# Patient Record
Sex: MALE | Race: OTHER
[De-identification: names, ages, dates, MRNs, and addresses within clinical notes are randomized per-mention and may not be internally consistent; named-entity substitution may affect disease eponyms.]

---

## 2019-05-24 PROBLEM — Z00.00 ENCOUNTER FOR PREVENTIVE HEALTH EXAMINATION: Status: ACTIVE | Noted: 2019-05-24

## 2019-05-28 ENCOUNTER — APPOINTMENT (OUTPATIENT)
Dept: ORTHOPEDIC SURGERY | Facility: CLINIC | Age: 34
End: 2019-05-28
Payer: COMMERCIAL

## 2019-05-28 DIAGNOSIS — M25.561 PAIN IN RIGHT KNEE: ICD-10-CM

## 2019-05-28 PROCEDURE — 99203 OFFICE O/P NEW LOW 30 MIN: CPT

## 2019-05-28 NOTE — HISTORY OF PRESENT ILLNESS
[FreeTextEntry1] : This is the first visit of a 34 years old male who presented for evaluation of the right knee pain. Previously patient had injury to his right knee where underwent surgical procedure. At present time patient continued to have knee pain recent MRI scan of the right knee demonstrated a torn meniscus medial and lateral period and at the same time and cystic incidental finding unrelated to the patient complained of knee pain

## 2019-05-28 NOTE — PHYSICAL EXAM
[FreeTextEntry1] : Physical exam reveals a healthy-looking patient in no apparent distress patient appear to be fully alert and oriented examination of the right knee demonstrates a localized tenderness with range of motion at the knee. No palpable mass no gross neurovascular deficit review of the MRI report demonstrated a torn medial and lateral meniscus with incidental finding of a cystic area possibly ganglion cyst at this stage patient was recommended to be seen by his spoke orthopedic surgeon in all the to consider a possible arthroscopy of the right knee